# Patient Record
Sex: MALE | Race: OTHER | HISPANIC OR LATINO | Employment: STUDENT | URBAN - METROPOLITAN AREA
[De-identification: names, ages, dates, MRNs, and addresses within clinical notes are randomized per-mention and may not be internally consistent; named-entity substitution may affect disease eponyms.]

---
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
## 2024-03-08 ENCOUNTER — HOSPITAL ENCOUNTER (EMERGENCY)
Facility: HOSPITAL | Age: 19
Discharge: HOME | End: 2024-03-08
Attending: EMERGENCY MEDICINE
Payer: COMMERCIAL

## 2024-03-08 ENCOUNTER — APPOINTMENT (EMERGENCY)
Dept: RADIOLOGY | Facility: HOSPITAL | Age: 19
End: 2024-03-08
Payer: COMMERCIAL

## 2024-03-08 VITALS
OXYGEN SATURATION: 100 % | HEART RATE: 86 BPM | TEMPERATURE: 98.3 F | DIASTOLIC BLOOD PRESSURE: 84 MMHG | SYSTOLIC BLOOD PRESSURE: 126 MMHG | RESPIRATION RATE: 18 BRPM

## 2024-03-08 DIAGNOSIS — S61.309A NAIL AVULSION, FINGER, INITIAL ENCOUNTER: Primary | ICD-10-CM

## 2024-03-08 PROCEDURE — 73140 X-RAY EXAM OF FINGER(S): CPT

## 2024-03-08 PROCEDURE — 99283 EMERGENCY DEPT VISIT LOW MDM: CPT

## 2024-03-08 PROCEDURE — 99284 EMERGENCY DEPT VISIT MOD MDM: CPT | Performed by: EMERGENCY MEDICINE

## 2024-03-08 PROCEDURE — 64450 NJX AA&/STRD OTHER PN/BRANCH: CPT | Performed by: EMERGENCY MEDICINE

## 2024-03-08 PROCEDURE — 73130 X-RAY EXAM OF HAND: CPT

## 2024-03-08 RX ORDER — ACETAMINOPHEN 325 MG/1
975 TABLET ORAL ONCE
Status: COMPLETED | OUTPATIENT
Start: 2024-03-08 | End: 2024-03-08

## 2024-03-08 RX ORDER — IBUPROFEN 600 MG/1
600 TABLET ORAL ONCE
Status: COMPLETED | OUTPATIENT
Start: 2024-03-08 | End: 2024-03-08

## 2024-03-08 RX ORDER — LIDOCAINE HYDROCHLORIDE AND EPINEPHRINE 10; 10 MG/ML; UG/ML
10 INJECTION, SOLUTION INFILTRATION; PERINEURAL ONCE
Status: COMPLETED | OUTPATIENT
Start: 2024-03-08 | End: 2024-03-08

## 2024-03-08 RX ADMIN — LIDOCAINE HYDROCHLORIDE,EPINEPHRINE BITARTRATE 10 ML: 10; .01 INJECTION, SOLUTION INFILTRATION; PERINEURAL at 20:21

## 2024-03-08 RX ADMIN — IBUPROFEN 600 MG: 600 TABLET, FILM COATED ORAL at 20:20

## 2024-03-08 RX ADMIN — ACETAMINOPHEN 975 MG: 325 TABLET, FILM COATED ORAL at 20:20

## 2024-03-09 NOTE — DISCHARGE INSTRUCTIONS
The nail should grow back over the next few weeks to months.     If you develop new or worsening symptoms, please return to the Emergency Department for further evaluation.

## 2024-03-09 NOTE — ED PROVIDER NOTES
History  Chief Complaint   Patient presents with    Finger Injury     Pt was playing basketball jammed L thumb and finger nail is pulled back. Pt also wants R hand looked at.     HPI    Patient is a 20 y/o M presenting for evaluation of finger injury. Patient jammed his left thumb playing basketball with resultant left thumb nail injury. The left thumb nail is distally avulsed with nail bent upwards. He states he overall has limited pain only where the nail is avulsed. He also c/o R hand pain where he punched a wall 3 months ago and wants this evaluated today as well. No numbness, tingling. Pt is R hand dominant.     Prior to Admission Medications   Prescriptions Last Dose Informant Patient Reported? Taking?   albuterol (Ventolin HFA) 90 mcg/act inhaler   No No   Sig: Inhale 2 puffs every 4 (four) hours as needed for wheezing or shortness of breath   cetirizine (ZyrTEC) 10 mg tablet  Mother Yes No   Sig: Take 10 mg by mouth daily as needed for allergies      Facility-Administered Medications: None       Past Medical History:   Diagnosis Date    Asthma        History reviewed. No pertinent surgical history.    Family History   Problem Relation Age of Onset    No Known Problems Mother     Hypertension Father     Asthma Father     Bipolar disorder Father     Depression Father      I have reviewed and agree with the history as documented.    E-Cigarette/Vaping     E-Cigarette/Vaping Substances     Social History     Tobacco Use    Smoking status: Passive Smoke Exposure - Never Smoker    Smokeless tobacco: Never   Substance Use Topics    Alcohol use: No    Drug use: No        Review of Systems   Constitutional:  Negative for chills and fever.   HENT:  Negative for ear pain and sore throat.    Eyes:  Negative for pain and visual disturbance.   Respiratory:  Negative for cough and shortness of breath.    Cardiovascular:  Negative for chest pain and palpitations.   Gastrointestinal:  Negative for abdominal pain and  vomiting.   Genitourinary:  Negative for dysuria and hematuria.   Musculoskeletal:  Negative for arthralgias and back pain.   Skin:  Negative for color change and rash.   Neurological:  Negative for seizures and syncope.   All other systems reviewed and are negative.      Physical Exam  ED Triage Vitals [03/08/24 1936]   Temperature Pulse Respirations Blood Pressure SpO2   98.3 °F (36.8 °C) 86 18 126/84 100 %      Temp Source Heart Rate Source Patient Position - Orthostatic VS BP Location FiO2 (%)   Oral Monitor -- Right arm --      Pain Score       --             Orthostatic Vital Signs  Vitals:    03/08/24 1936   BP: 126/84   Pulse: 86       Physical Exam  Vitals and nursing note reviewed.   Constitutional:       General: He is not in acute distress.     Appearance: He is well-developed. He is not toxic-appearing.   HENT:      Head: Normocephalic and atraumatic.      Right Ear: External ear normal.      Left Ear: External ear normal.      Nose: Nose normal.      Mouth/Throat:      Pharynx: Oropharynx is clear. No oropharyngeal exudate or posterior oropharyngeal erythema.   Eyes:      Extraocular Movements: Extraocular movements intact.      Conjunctiva/sclera: Conjunctivae normal.      Pupils: Pupils are equal, round, and reactive to light.   Cardiovascular:      Rate and Rhythm: Normal rate and regular rhythm.      Pulses: Normal pulses.      Heart sounds: Normal heart sounds. No murmur heard.     No friction rub. No gallop.   Pulmonary:      Effort: Pulmonary effort is normal. No respiratory distress.      Breath sounds: Normal breath sounds. No wheezing, rhonchi or rales.   Abdominal:      General: Abdomen is flat.      Palpations: Abdomen is soft.      Tenderness: There is no abdominal tenderness. There is no guarding or rebound.   Musculoskeletal:         General: Signs of injury present.      Cervical back: Normal range of motion. No rigidity.      Right lower leg: No edema.      Left lower leg: No edema.       Comments: L thumb nail distally avulsed and bent backwards at a 90 degree angle. There is no active bleeding. The nail matrix is intact. There is no nailbed laceration. There is normal rom of the thumb and hand.   The R hand has normal ROM, no bruising, deformity. There is no TTP.    Skin:     General: Skin is warm and dry.      Capillary Refill: Capillary refill takes less than 2 seconds.   Neurological:      General: No focal deficit present.      Mental Status: He is alert.   Psychiatric:         Mood and Affect: Mood normal.         ED Medications  Medications   ibuprofen (MOTRIN) tablet 600 mg (600 mg Oral Given 3/8/24 2020)   acetaminophen (TYLENOL) tablet 975 mg (975 mg Oral Given 3/8/24 2020)   lidocaine-epinephrine (XYLOCAINE/EPINEPHRINE) 1 %-1:100,000 injection 10 mL (10 mL Infiltration Given by Other 3/8/24 2021)       Diagnostic Studies  Results Reviewed       None                   XR thumb first digit-min 2 views LEFT    (Results Pending)   XR hand 3+ views RIGHT    (Results Pending)         Procedures  Digital Block    Date/Time: 3/8/2024 8:32 PM    Performed by: Frankie Hein MD  Authorized by: Frankie Hein MD    Consent:     Consent obtained:  Verbal    Consent given by:  Patient    Risks discussed:  Allergic reaction, bleeding, intravascular injection, nerve damage, pain, infection, unsuccessful block and swelling    Alternatives discussed:  No treatment, alternative treatment, delayed treatment and referral  Indications:     Indications:  Pain relief  Location:     Block location:  Finger    Finger blocked:  L thumb  Pre-procedure details:     Neurovascular status: intact      Skin preparation:  2% chlorhexidine  Procedure details (see MAR for exact dosages):     Needle gauge:  25 G    Anesthetic injected:  Lidocaine 1% WITH epi    Technique:  Wing block    Injection procedure:  Anatomic landmarks identified, anatomic landmarks palpated, introduced needle, incremental injection and negative  aspiration for blood  Post-procedure details:     Outcome:  Pain improved    Patient tolerance of procedure:  Tolerated well, no immediate complications        ED Course  ED Course as of 03/08/24 2345   Fri Mar 08, 2024   2018 Pt refused tetanus update                              SBIRT 22yo+      Flowsheet Row Most Recent Value   Initial Alcohol Screen: US AUDIT-C     1. How often do you have a drink containing alcohol? 0 Filed at: 03/08/2024 2034   2. How many drinks containing alcohol do you have on a typical day you are drinking?  0 Filed at: 03/08/2024 2034   3a. Male UNDER 65: How often do you have five or more drinks on one occasion? 0 Filed at: 03/08/2024 2034   Audit-C Score 0 Filed at: 03/08/2024 2034   LEOPOLDO: How many times in the past year have you...    Used an illegal drug or used a prescription medication for non-medical reasons? Never Filed at: 03/08/2024 2034                  Medical Decision Making  18 y/o M presenting for evaluation of thumb injury. VSS. Digital block performed for better evaluation. The thumb nail was trimmed at bedside to about midway down the nail to improve the angulation of the bent nail. This was overall well tolerated, there is still a portion slightly angulated however patient states he prefers to try to use nail clippers or filing at home rather than have too much of nail removed in ED. XR reviewed of left thumb and R hand and no acute osseous abnormality identified. Pt stable for dc. Hand surgery contact information given.     Amount and/or Complexity of Data Reviewed  Radiology: ordered.    Risk  OTC drugs.  Prescription drug management.        Disposition  Final diagnoses:   Nail avulsion, finger, initial encounter     Time reflects when diagnosis was documented in both MDM as applicable and the Disposition within this note       Time User Action Codes Description Comment    3/8/2024  8:46 PM Frankie Hein Add [S61.309A] Nail avulsion, finger, initial encounter            ED Disposition       ED Disposition   Discharge    Condition   Stable    Date/Time   Fri Mar 8, 2024 2046    Comment   Cassandra Lemons discharge to home/self care.                   Follow-up Information       Follow up With Specialties Details Why Contact Info Additional Information    [facility] Emergency Department Emergency Medicine  If symptoms worsen [address] [facility] [id number] [phone] Formerly [facility], [address] [place] [address], [id number] [phone]    Piter Mercado MD Orthopedic Surgery, Hand Surgery Schedule an appointment as soon as possible for a visit  As needed [address] [facility] [id number] [phone]               Discharge Medication List as of 3/8/2024  8:48 PM        CONTINUE these medications which have NOT CHANGED    Details   albuterol (Ventolin HFA) 90 mcg/act inhaler Inhale 2 puffs every 4 (four) hours as needed for wheezing or shortness of breath, Starting Tue 12/8/2020, Normal      cetirizine (ZyrTEC) 10 mg tablet Take 10 mg by mouth daily as needed for allergies, Historical Med           No discharge procedures on file.    PDMP Review       None             ED Provider  Attending physically available and evaluated Cassandra Lemons. I managed the patient along with the ED Attending.    Electronically Signed by           Frankie Hein MD  03/08/24 1956

## 2024-03-09 NOTE — ED ATTENDING ATTESTATION
3/8/2024  I, Christian Kendrick DO, saw and evaluated the patient. I have discussed the patient with the resident/non-physician practitioner and agree with the resident's/non-physician practitioner's findings, Plan of Care, and MDM as documented in the resident's/non-physician practitioner's note, except where noted. All available labs and Radiology studies were reviewed.  I was present for key portions of any procedure(s) performed by the resident/non-physician practitioner and I was immediately available to provide assistance.       At this point I agree with the current assessment done in the Emergency Department.  I have conducted an independent evaluation of this patient a history and physical is as follows:    19-year-old right-hand-dominant male presents for evaluation of left thumbnail partial avulsion after it was jammed while playing basketball.  Complains of pain at the site bleeding is controlled.  Additionally complains of right hand pain from an injury 2 months ago and request x-ray for this.    Impression: Left thumb partial thumb nail avulsion.  Chronic right hand pain plan: X-ray right hand, digital block left thumb, partial Onychectomy, dressing      ED Course         Critical Care Time  Procedures